# Patient Record
Sex: FEMALE | Race: BLACK OR AFRICAN AMERICAN | URBAN - METROPOLITAN AREA
[De-identification: names, ages, dates, MRNs, and addresses within clinical notes are randomized per-mention and may not be internally consistent; named-entity substitution may affect disease eponyms.]

---

## 2023-09-06 PROBLEM — D64.9 ANEMIA: Status: ACTIVE | Noted: 2023-09-06

## 2024-09-24 ENCOUNTER — TELEPHONE (OUTPATIENT)
Age: 44
End: 2024-09-24

## 2024-11-01 ENCOUNTER — TELEPHONE (OUTPATIENT)
Age: 44
End: 2024-11-01

## 2025-02-12 ENCOUNTER — TELEMEDICINE (OUTPATIENT)
Age: 45
End: 2025-02-12
Payer: MEDICAID

## 2025-02-12 DIAGNOSIS — M25.512 CHRONIC LEFT SHOULDER PAIN: Primary | ICD-10-CM

## 2025-02-12 DIAGNOSIS — G89.29 CHRONIC LEFT SHOULDER PAIN: Primary | ICD-10-CM

## 2025-02-12 DIAGNOSIS — D50.9 IRON DEFICIENCY ANEMIA, UNSPECIFIED IRON DEFICIENCY ANEMIA TYPE: ICD-10-CM

## 2025-02-12 PROCEDURE — 99203 OFFICE O/P NEW LOW 30 MIN: CPT | Performed by: STUDENT IN AN ORGANIZED HEALTH CARE EDUCATION/TRAINING PROGRAM

## 2025-02-12 RX ORDER — METHYLPREDNISOLONE 4 MG/1
TABLET ORAL
Qty: 21 TABLET | Refills: 0 | Status: SHIPPED | OUTPATIENT
Start: 2025-02-12

## 2025-02-12 SDOH — ECONOMIC STABILITY: FOOD INSECURITY: WITHIN THE PAST 12 MONTHS, YOU WORRIED THAT YOUR FOOD WOULD RUN OUT BEFORE YOU GOT MONEY TO BUY MORE.: NEVER TRUE

## 2025-02-12 SDOH — ECONOMIC STABILITY: FOOD INSECURITY: WITHIN THE PAST 12 MONTHS, THE FOOD YOU BOUGHT JUST DIDN'T LAST AND YOU DIDN'T HAVE MONEY TO GET MORE.: NEVER TRUE

## 2025-02-12 ASSESSMENT — PATIENT HEALTH QUESTIONNAIRE - PHQ9
1. LITTLE INTEREST OR PLEASURE IN DOING THINGS: NOT AT ALL
2. FEELING DOWN, DEPRESSED OR HOPELESS: NOT AT ALL
SUM OF ALL RESPONSES TO PHQ QUESTIONS 1-9: 0
SUM OF ALL RESPONSES TO PHQ9 QUESTIONS 1 & 2: 0
SUM OF ALL RESPONSES TO PHQ QUESTIONS 1-9: 0

## 2025-02-12 NOTE — PROGRESS NOTES
Previous PCP: Javier Lawrence MD [facility] PRIMARY CARE     Chief Complaint   Patient presents with    New Patient    Establish Care     \"Have you been to the ER, urgent care clinic since your last visit?  Hospitalized since your last visit?\"    NO    “Have you seen or consulted any other health care providers outside of [facility] since your last visit?”      Yes  Chiropractor  10/2024  Neck Pain    Have you had a mammogram?”   NO       “Have you had a pap smear?”    NO    Has not seen one in 16 years    There were no vitals filed for this visit.   Health Maintenance Due   Topic Date Due    Varicella vaccine (1 of 2 - 13+ 2-dose series) Never done    HIV screen  Never done    Hepatitis C screen  Never done    Hepatitis B vaccine (1 of 3 - 19+ 3-dose series) Never done    DTaP/Tdap/Td vaccine (1 - Tdap) Never done    Cervical cancer screen  Never done    Breast cancer screen  Never done    Flu vaccine (1) Never done    COVID-19 Vaccine (2024- season) Never done      The patient, Yklahghassan Gonzalez, identity was verified by name and , pharmacy verified  Labs:N/A  Fasting:N/A

## 2025-02-12 NOTE — ASSESSMENT & PLAN NOTE
We did discuss that we could continue to seek out nonoperative modalities, such as: NSAIDs, oral and topical analgesics, joint injections, physical therapy, stretching, strengthening, and activity modification. The patient stated their understanding with this and would like to proceed with nonsurgical management;  -Follows with PT and chiropractor.  -They plan on getting shoulder injection but she had to put it off due to fear of needle.

## 2025-02-12 NOTE — PROGRESS NOTES
[facility]  [address] [place] [address] [phone]    Chief Complaint: Acute/chronic problems and establishing care    Subjective  Kylah Gonzalez is a 44 y.o. Black /  female , new patient, here for evaluation of the concern(s) above;    SUBJECTIVE:     Pt would like to be evaluated for the problem(s) listed above:     Pt is new to the practice.    Previous pcp: Previous PCP: Javier Lawrence MD [facility] PRIMARY [facility]         Significant PMHx include: Severe Anemia  here to establish care and follow up on acute/chronic conditions;    Severe Anemia -  Pt states that her Hb has been as low as 7.4, with ferritin of 2. They offered transfusion but she declined.   Also was [facility] with [facility] and they recommended IV infusion and she admits to stop following with them.  States that her periods are regular.    Pt states that she has fear of needles and has not followed with providers once they suggested infusion or anything involving needles.  States that she has been helping her levels with food. Stopped taking PO Iron because it makes her nauseated.  Denies any ice craving, dizziness or lightheadedness, fatigue, weakness, shortness of breath, pale skin,Irregular heartbeat, chest discomfort and abnormally cold extremities.       Abnormal pap:  States that it has been 16 years after having a LEEP procedure, she stopped having sex.  States that she \"gave up on sex\" since then. States that she was following up with them as well but stopped going as well.     Left shoulder pain:  Diagnosed recently with froozen shoulder in 10/2024.  Follows with PT and chiropractor.    Pt denies any  fever, chill, chest pain, SOB, abdominal pain, n/v/d, HA or dizziness.     Other Health Habits and social history:  Occupation: in real estate  Has 3 sons (18, 20 and 24 yo).    Other Specialists/providers:  States that she will schedule appointment with

## 2025-02-12 NOTE — ASSESSMENT & PLAN NOTE
Pt not interested in blood work. States that she will continue managing through diet and exercise.